# Patient Record
Sex: FEMALE | Race: BLACK OR AFRICAN AMERICAN | Employment: FULL TIME | URBAN - METROPOLITAN AREA
[De-identification: names, ages, dates, MRNs, and addresses within clinical notes are randomized per-mention and may not be internally consistent; named-entity substitution may affect disease eponyms.]

---

## 2018-10-12 ENCOUNTER — HOSPITAL ENCOUNTER (OUTPATIENT)
Dept: LAB | Age: 38
Discharge: HOME | End: 2018-10-12

## 2018-10-12 ENCOUNTER — OFFICE VISIT (OUTPATIENT)
Dept: ORTHOPEDIC SURGERY | Age: 38
End: 2018-10-12

## 2018-10-12 VITALS
TEMPERATURE: 98.4 F | WEIGHT: 231 LBS | OXYGEN SATURATION: 99 % | RESPIRATION RATE: 16 BRPM | SYSTOLIC BLOOD PRESSURE: 118 MMHG | BODY MASS INDEX: 40.93 KG/M2 | DIASTOLIC BLOOD PRESSURE: 82 MMHG | HEIGHT: 63 IN | HEART RATE: 87 BPM

## 2018-10-12 DIAGNOSIS — M51.37 DEGENERATIVE DISC DISEASE AT L5-S1 LEVEL: ICD-10-CM

## 2018-10-12 DIAGNOSIS — M54.50 LUMBAR PAIN: Primary | ICD-10-CM

## 2018-10-12 DIAGNOSIS — M05.20 RHEUMATOID ARTERITIS (HCC): ICD-10-CM

## 2018-10-12 DIAGNOSIS — M25.561 RIGHT KNEE PAIN, UNSPECIFIED CHRONICITY: ICD-10-CM

## 2018-10-12 DIAGNOSIS — M25.562 LEFT KNEE PAIN, UNSPECIFIED CHRONICITY: ICD-10-CM

## 2018-10-12 PROBLEM — E66.01 OBESITY, MORBID (HCC): Status: ACTIVE | Noted: 2018-10-12

## 2018-10-12 LAB — XX-LABCORP SPECIMEN COL,LCBCF: NORMAL

## 2018-10-12 PROCEDURE — 99001 SPECIMEN HANDLING PT-LAB: CPT | Performed by: PHYSICIAN ASSISTANT

## 2018-10-12 RX ORDER — HYDROCODONE BITARTRATE AND ACETAMINOPHEN 7.5; 325 MG/1; MG/1
1 TABLET ORAL
Qty: 28 TAB | Refills: 0 | Status: SHIPPED | OUTPATIENT
Start: 2018-10-12

## 2018-10-12 NOTE — PROGRESS NOTES
[address] [place] [address], [address] [place] [address]  [phone]           Patient: Ashley Thao                MRN: [id number]       SSN: [id number]  YOB: 1980        AGE: 44 y.o. SEX: female  Body mass index is 40.92 kg/(m^2). PCP: No primary care provider on file. 10/12/18      This office note has been dictated. REVIEW OF SYSTEMS:  Constitutional: Negative for fever, chills, weight loss and malaise/fatigue. HENT: Negative. Eyes: Negative. Respiratory: Negative. Cardiovascular: Negative. Gastrointestinal: No bowel incontinence or constipation. Genitourinary: No bladder incontinence or saddle anesthesia. Skin: Negative. Neurological: Negative. Endo/Heme/Allergies: Negative. Psychiatric/Behavioral: Negative. Musculoskeletal: As per HPI above. No past medical history on file. Current Outpatient Prescriptions:     aspirin-acetaminophen-caffeine (EXCEDRIN ES) 250-250-65 mg per tablet, Take 1 Tab by mouth., Disp: , Rfl:     No Known Allergies    Social History     Social History    Marital status: SINGLE     Spouse name: N/A    Number of children: N/A    Years of education: N/A     Occupational History    Not on file. Social History Main Topics    Smoking status: Current Every Day Smoker    Smokeless tobacco: Never Used    Alcohol use Not on file    Drug use: Not on file    Sexual activity: Not on file     Other Topics Concern    Not on file     Social History Narrative    No narrative on file       No past surgical history on file. SUBJECTIVE:  The patient is seen today for evaluation and opinion and advice regarding low back pain and bilateral lower extremity pain. The patient has a long-standing history of some back troubles. She has been seen in [place] by a couple of different physicians with no real treatment for her.   She has had knee troubles as well in the past with apparently a left knee scope in July 2017. The right knee apparently has an ACL injury with no surgery. The patient states that most of her trouble is bilateral lower extremity discomfort when she is up and ambulating, as well as being seated for extended periods. She gets pain that radiates into her knees and down her legs to the top of her foot. She has had no change in her bowel or bladder habits and no fevers or chills to report. PHYSICAL EXAMINATION:  In general, the patient is alert and oriented x 3 in no acute distress. The patient is well-developed, well-nourished, with a normal affect. The patient is afebrile. HEENT:  Head is normocephalic and atraumatic. Pupils are equally round and reactive to light and accommodation. Extraocular eye movements are intact. Neck is supple. Trachea is midline. No JVD is present. Breathing is nonlabored. Examination of the back reveals the skin is intact. There is no ecchymosis and no warmth. There is no pain to palpation to the midline lumbar spine. There is a little paraspinal muscular discomfort present. There is no pain with rotation of either hip. There is no pain to palpation of the greater trochanteric bursae. There is negative straight leg raise. There is negative calf tenderness. There is negative Christiannes. There is no evidence of DVT present. Sensory and motor is intact to the lower extremities and symmetric bilaterally to myotomes and dermatomes. Each of the knees reveals the skin is intact. There is no ecchymosis and no warmth. The previous surgical portals for the left knee scope are healed nicely. She does have some crepitus anteriorly with range of motion activities and some discomfort to palpation of the medial joint line of each of her knees.        RADIOGRAPHS:  Radiographs in the office today, including AP and lateral of the lumbar spine, show multilevel degenerative changes worse at L4-5 and degenerative disc disease with near bone-to-bone eburnation. AP, tunnel, lateral, and skyline of each of the knees show medial wear, as well as patellofemoral wear. This is moderate in each of these areas. ASSESSMENT:      1. Lumbar radiculopathy. 2. Lumbar degenerative disc disease. 3. Bilateral knee osteoarthritis. PLAN:  At this point, we discussed treatment options. I think that a lot of her discomfort is coming from her back with radicular symptomatology. We are going to get an MRI of the lumbar spine for further evaluation. We are also going to do a rheumatologic workup and obtain some labs. We will see her back afterwards for review and to discuss treatment options. We can consider a referral to The [address] [place] [address] and physical therapy, as well as treatment for the knees depending on the MRI results. She will call with any questions or concerns that shall arise.                    JR Carlos Manuel HUI, PAReeseC, ATC

## 2018-10-23 DIAGNOSIS — M05.20 RHEUMATOID ARTERITIS (HCC): ICD-10-CM

## 2018-10-26 ENCOUNTER — TELEPHONE (OUTPATIENT)
Dept: ORTHOPEDIC SURGERY | Age: 38
End: 2018-10-26

## 2018-10-26 DIAGNOSIS — M51.37 DEGENERATIVE DISC DISEASE AT L5-S1 LEVEL: Primary | ICD-10-CM

## 2018-10-26 NOTE — TELEPHONE ENCOUNTER
Our request for a MRI of the Lumbar Spine without contrast was denied by Amelia Hayes. If Mr. Fay Warner would like to complete a peer to peer review, please call [phone], Patient ID [id number]. After checking the chart the denial may have resulting from lack of conservative therapy. If the provider would please verify, I would greatly appreciate the effort. Thank you.

## 2018-10-29 ENCOUNTER — TELEPHONE (OUTPATIENT)
Dept: ORTHOPEDIC SURGERY | Age: 38
End: 2018-10-29

## 2018-10-29 NOTE — TELEPHONE ENCOUNTER
[employer] contacted MsKristin Tawanna Martin for scheduling. She states \"1 C 10/29 PT IS GOING FOR A F/U ON 11/1 W/ JSTEPHY AND SHE IS GETS MRI RESULTS BACK  SHE WCB TO [facility] IF RESULTS COME BACK OK 10/29\". Thank you.

## 2018-11-01 ENCOUNTER — OFFICE VISIT (OUTPATIENT)
Dept: ORTHOPEDIC SURGERY | Age: 38
End: 2018-11-01

## 2018-11-01 VITALS
OXYGEN SATURATION: 97 % | DIASTOLIC BLOOD PRESSURE: 79 MMHG | SYSTOLIC BLOOD PRESSURE: 127 MMHG | WEIGHT: 229.8 LBS | RESPIRATION RATE: 16 BRPM | TEMPERATURE: 98.6 F | HEART RATE: 93 BPM | BODY MASS INDEX: 40.72 KG/M2 | HEIGHT: 63 IN

## 2018-11-01 DIAGNOSIS — M54.5 MIDLINE LOW BACK PAIN, UNSPECIFIED CHRONICITY, WITH SCIATICA PRESENCE UNSPECIFIED: Primary | ICD-10-CM

## 2018-11-01 DIAGNOSIS — M79.604 BILATERAL LEG PAIN: ICD-10-CM

## 2018-11-01 DIAGNOSIS — M79.605 BILATERAL LEG PAIN: ICD-10-CM

## 2018-11-01 RX ORDER — CYCLOBENZAPRINE HCL 10 MG
10 TABLET ORAL
Qty: 45 TAB | Refills: 1 | Status: SHIPPED | OUTPATIENT
Start: 2018-11-01

## 2018-11-01 NOTE — PROGRESS NOTES
[address] [place] [address], [address] [place] [address]  [phone]           Patient: Anthony Calloway                MRN: [id number]       SSN: [id number]  YOB: 1980        AGE: 44 y.o. SEX: female  Body mass index is 40.71 kg/m². PCP: Abdoul Saleem MD  11/01/18      This office note has been dictated. REVIEW OF SYSTEMS:  Constitutional: Negative for fever, chills, weight loss and malaise/fatigue. HENT: Negative. Eyes: Negative. Respiratory: Negative. Cardiovascular: Negative. Gastrointestinal: No bowel incontinence or constipation. Genitourinary: No bladder incontinence or saddle anesthesia. Skin: Negative. Neurological: Negative. Endo/Heme/Allergies: Negative. Psychiatric/Behavioral: Negative. Musculoskeletal: As per HPI above. No past medical history on file. Current Outpatient Medications:     aspirin-acetaminophen-caffeine (EXCEDRIN ES) 250-250-65 mg per tablet, Take 1 Tab by mouth., Disp: , Rfl:     HYDROcodone-acetaminophen (NORCO) 7.5-325 mg per tablet, Take 1 Tab by mouth every six (6) hours as needed for Pain.  Max Daily Amount: 4 Tabs., Disp: 28 Tab, Rfl: 0    No Known Allergies    Social History     Socioeconomic History    Marital status: SINGLE     Spouse name: Not on file    Number of children: Not on file    Years of education: Not on file    Highest education level: Not on file   Social Needs    Financial resource strain: Not on file    Food insecurity - worry: Not on file    Food insecurity - inability: Not on file    Transportation needs - medical: Not on file   [email or web address] needs - non-medical: Not on file   Occupational History    Not on file   Tobacco Use    Smoking status: Current Every Day Smoker    Smokeless tobacco: Never Used   Substance and Sexual Activity    Alcohol use: Not on file    Drug use: Not on file    Sexual activity: Not on file   Other Topics Concern  Not on file   Social History Narrative    Not on file       No past surgical history on file. SUBJECTIVE:  The patient is seen today for reevaluation of low back pain with pain down the bilateral lower extremities. I ordered an MRI. She has not been approved for it yet. She does continue to have low back pain with pain radiating down the lower extremities. It is worse at night lying in bed. She has pain in ambulation. She reports pain in her low back and to her pelvis, to her thighs, and down her lower extremities. She has had no change in her bowel or bladder habits and no loss of control. She denies any saddle paresthesias. PHYSICAL EXAMINATION:  In general, the patient is alert and oriented x 3 in no acute distress. The patient is well-developed, well-nourished, with a normal affect. The patient is afebrile. HEENT:  Head is normocephalic and atraumatic. Pupils are equally round and reactive to light and accommodation. Extraocular eye movements are intact. Neck is supple. Trachea is midline. No JVD is present. Breathing is nonlabored. Examination of the lower extremities reveals pain-free range of motion of the hips. There is no pain to palpation of the greater trochanteric bursae. There is negative straight leg raise. There is negative calf tenderness. There is negative Christiannes. There is no evidence of DVT present. Sensory and motor are intact and symmetrical to the bilateral lower extremities. LABORATORY:  Labs and rheumatologic workup shows a sed rate of 54, C-reactive protein quantitative 22.1. RA latex is negative at 10. There is a normal uric acid of 4.0. Lymes western blot IgG is positive with IgM negative. The result is considered negative. CONCEPCION comprehensive panel is 2, which is normal.      ASSESSMENT:      1. Elevated rheumatologic markers. 2. Low back pain with radicular symptomatology.      PLAN:  At this point, we will get her set up for physical therapy for a low back program.  We will see her back in the office in about six weeks time for evaluation and to  the efficacy of physical therapy. We may consider an MRI at that point depending on her symptomatology. We will try some Flexeril at night, as she is having some spasms into her legs. We will also refer her to Rheumatology for further evaluation and treatment regarding her elevated CRP and sed rate.                      JR Carlos Manuel HUI, PAReeseC, ATC

## 2018-11-15 ENCOUNTER — TELEPHONE (OUTPATIENT)
Dept: ORTHOPEDIC SURGERY | Age: 38
End: 2018-11-15

## 2018-11-15 DIAGNOSIS — M79.604 LUMBAR PAIN WITH RADIATION DOWN BOTH LEGS: Primary | ICD-10-CM

## 2018-11-15 DIAGNOSIS — M54.50 LUMBAR PAIN WITH RADIATION DOWN BOTH LEGS: Primary | ICD-10-CM

## 2018-11-15 DIAGNOSIS — M79.605 LUMBAR PAIN WITH RADIATION DOWN BOTH LEGS: Primary | ICD-10-CM

## 2018-11-15 NOTE — TELEPHONE ENCOUNTER
Patient called to inform Lindsay Escobar she did have a previous MRI of her lumbar spine and she has the disc. Patient aware Georgina Mims is out of the office this week. Does JR want the patient to drop of the disc?     Also patient would like to be scheduled with a physical therapy center in   [address] Patient P.T[facility]  [phone]  Please call patient back at [phone]

## 2018-11-16 NOTE — TELEPHONE ENCOUNTER
PT Referral faxed to [address] [place] [address] in [place], [place] @ fax # [phone] as requested.

## 2018-11-19 NOTE — TELEPHONE ENCOUNTER
Spoke with patient, please note she is very hard to understand. She states her \"knee doctor\" did a previous LSpine MRI but they do not treat backs there so there is no report of said MRI. I explained to the patient that generally all MRI's are read by Radiologists therefore a report is generated from their dictation. Advised patient she could bring the disc in for [facility] to look at but he is going to want a report to review. Patient is extremely confused as to why she was scheduled to see [facility] in the first place since he is not a back specialist. I advised the patient that [facility] wanted to obtain an MRI and then possibly refer her to our spine center depending on the results of the MRI. Patient states she is going to bring in the disc for [facility] to review. Also going to put in a referral to our spine center.

## 2018-12-12 ENCOUNTER — TELEPHONE (OUTPATIENT)
Dept: ORTHOPEDIC SURGERY | Age: 38
End: 2018-12-12

## 2018-12-12 DIAGNOSIS — R76.8 POSITIVE LYME DISEASE SEROLOGY: Primary | ICD-10-CM

## 2018-12-12 NOTE — TELEPHONE ENCOUNTER
Referral entered for Infectious Disease per MAKEDA Horowitz.  will contact patient to schedule an appointment.

## 2018-12-12 NOTE — TELEPHONE ENCOUNTER
Lavelle Howell from [address] [place] [address] called regarding a positive Lyme test for patient. Lab test was originally done 10/12/18, Lavelle Howell has just recently gotten the results.   Please contact her to further discuss at [phone]

## 2018-12-12 NOTE — TELEPHONE ENCOUNTER
Discussed with Natalie Stoner from [facility] department    Please refer patient to infectious disease.   Pos Lymes titer

## 2019-01-15 ENCOUNTER — OFFICE VISIT (OUTPATIENT)
Dept: ORTHOPEDIC SURGERY | Age: 39
End: 2019-01-15

## 2019-01-15 VITALS
HEIGHT: 63 IN | HEART RATE: 91 BPM | DIASTOLIC BLOOD PRESSURE: 80 MMHG | BODY MASS INDEX: 40.57 KG/M2 | WEIGHT: 229 LBS | SYSTOLIC BLOOD PRESSURE: 127 MMHG

## 2019-01-15 DIAGNOSIS — F17.200 TOBACCO USE DISORDER: ICD-10-CM

## 2019-01-15 DIAGNOSIS — M51.26 HNP (HERNIATED NUCLEUS PULPOSUS), LUMBAR: Primary | ICD-10-CM

## 2019-01-15 DIAGNOSIS — M47.816 LUMBAR FACET ARTHROPATHY: ICD-10-CM

## 2019-01-15 DIAGNOSIS — M54.16 LUMBAR RADICULOPATHY: ICD-10-CM

## 2019-01-15 DIAGNOSIS — M62.838 MUSCLE SPASM: ICD-10-CM

## 2019-01-15 RX ORDER — GABAPENTIN 300 MG/1
CAPSULE ORAL
Qty: 90 CAP | Refills: 1 | Status: SHIPPED | OUTPATIENT
Start: 2019-01-15

## 2019-01-15 RX ORDER — BUSPIRONE HYDROCHLORIDE 10 MG/1
10 TABLET ORAL
COMMUNITY
Start: 2019-01-09

## 2019-01-15 RX ORDER — SERTRALINE HYDROCHLORIDE 100 MG/1
TABLET, FILM COATED ORAL
COMMUNITY
Start: 2019-01-09

## 2019-01-15 RX ORDER — DICLOFENAC SODIUM 75 MG/1
75 TABLET, DELAYED RELEASE ORAL 2 TIMES DAILY WITH MEALS
Qty: 60 TAB | Refills: 1 | Status: SHIPPED | OUTPATIENT
Start: 2019-01-15

## 2019-01-15 NOTE — PROGRESS NOTES
[facility]  [address] [place], [address]  Phone: [phone]  Fax: [phone]    NEW PATIENT  Pt's YOB: 1980    ASSESSMENT   Diagnoses and all orders for this visit:    1. HNP (herniated nucleus pulposus), lumbar  -     gabapentin (NEURONTIN) 300 mg capsule; Take 1 in the morning and 2 in the evening as directed  -     diclofenac EC (VOLTAREN) 75 mg EC tablet; Take 1 Tab by mouth two (2) times daily (with meals). -     REFERRAL TO PHYSICAL THERAPY    2. Lumbar radiculopathy  -     gabapentin (NEURONTIN) 300 mg capsule; Take 1 in the morning and 2 in the evening as directed  -     REFERRAL TO PHYSICAL THERAPY    3. Lumbar facet arthropathy  -     diclofenac EC (VOLTAREN) 75 mg EC tablet; Take 1 Tab by mouth two (2) times daily (with meals). -     REFERRAL TO PHYSICAL THERAPY    4. Muscle spasm    5. Tobacco use disorder         IMPRESSION AND PLAN:  Tianna Scott is a 43 y.o. female with history of lumbar pain x ~1 year. She complains of pain radiating down both legs to the feet. Pt notes that her lower back pain started to worsen about 1 year ago. Of note, she had previous bilateral arthroscopic knee surgeries and last has right knee surgery on 12/12/2018. She notes stomach ulcer when taking ibuprofen and is enrolled in knee physical therapy. 1) Pt was given information on cervical and lumbar arthritis exercises. 2) She was referred to [facility] physical therapy in [facility] for core strengthening and pain centralization. 3) Pt was prescribed Neurontin 300 mg 1 tab QAM and 2 tabs QHS, tapering up as directed. 4) She was prescribed Voltaren 75 mg 1 tab BID prn with food. Pt was instructed not to take ibuprofen with the medication. 5) Ms. Marie Ramirez has a reminder for a \"due or due soon\" health maintenance. I have asked that she contact her primary care provider, Other, MD Abdoul, for follow-up on this health maintenance.   6)  demonstrated consistency with prescribing. 7) Pt will follow-up in 6 weeks or sooner if needed. HISTORY OF PRESENT ILLNESS:  Fan Woods is a 43 y.o. female with history of lumbar pain x ~1 year. Pt presents to the office today as a new patient referred by La Nena Sauer. She complains of pain radiating down both legs to the feet. Pt reports increased pain when she sits or when her leg press up against anything. She admits to pain in the legs when sleeping and during the day. 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 Her job required frequent bending and lifting and she reports daily lower back pain while working. She notes that her lower back pain started to worsen about 1 year ago. Of note, she had previous bilateral arthroscopic knee surgeries and last has right knee surgery on 12/12/2018. Pt notes that she was told by different providers that she an MCL and ACL tear (she is unsure which one she actually had). She notes that she still has decreased range of motion in the right knee since her surgery in 12/2018. She denies any previous lumbar surgery. She used to take ibuprofen but discontinued this when it started to cause stomach ulcers. Pt has been attending [facility] knee physical therapy in [facility]. Pt at this time desires to proceed with medication evaluation and aqua physical therapy. Of note, she lives in [place], [place]. She is a smoker. Pain Scale: 8/10     PCP: Other, MD Abdoul    History reviewed. No pertinent past medical history.      Social History     Socioeconomic History    Marital status: SINGLE     Spouse name: Not on file    Number of children: Not on file    Years of education: Not on file    Highest education level: Not on file   Social Needs    Financial resource strain: Not on file    Food insecurity - worry: Not on file    Food insecurity - inability: Not on file   [employer] needs - medical: Not on file    Transportation needs - non-medical: Not on file   Occupational History    Not on file   Tobacco Use    Smoking status: Current Every Day Smoker    Smokeless tobacco: Never Used   Substance and Sexual Activity    Alcohol use: Yes    Drug use: Not on file    Sexual activity: Not on file   Other Topics Concern     Service Not Asked    Blood Transfusions Not Asked    Caffeine Concern Not Asked    Occupational Exposure Not Asked    Hobby Hazards Not Asked    Sleep Concern Not Asked    Stress Concern Not Asked    Weight Concern Not Asked    Special Diet Not Asked    Back Care Not Asked    Exercise Not Asked    Bike Helmet Not Asked   [address] Not Asked    Self-Exams Not Asked   Social History Narrative    Not on file       Current Outpatient Medications   Medication Sig Dispense Refill    busPIRone (BUSPAR) 10 mg tablet Take 10 mg by mouth.  sertraline (ZOLOFT) 100 mg tablet sertraline 100 mg tablet   TAKE 1 Tablet  BY MOUTH TWICE DAILY      gabapentin (NEURONTIN) 300 mg capsule Take 1 in the morning and 2 in the evening as directed 90 Cap 1    diclofenac EC (VOLTAREN) 75 mg EC tablet Take 1 Tab by mouth two (2) times daily (with meals). 60 Tab 1    cyclobenzaprine (FLEXERIL) 10 mg tablet Take 1 Tab by mouth three (3) times daily as needed for Muscle Spasm(s). 45 Tab 1    aspirin-acetaminophen-caffeine (EXCEDRIN ES) 250-250-65 mg per tablet Take 1 Tab by mouth.  HYDROcodone-acetaminophen (NORCO) 7.5-325 mg per tablet Take 1 Tab by mouth every six (6) hours as needed for Pain. Max Daily Amount: 4 Tabs. 28 Tab 0       No Known Allergies    REVIEW OF SYSTEMS    Constitutional: Negative for fever, chills, or weight change. Respiratory: Negative for cough or shortness of breath. Cardiovascular: Negative for chest pain or palpitations. Gastrointestinal: Negative for acid reflux, change in bowel habits, or constipation.   Genitourinary: Negative for dysuria and flank pain. Musculoskeletal: Positive for lumbar pain. Skin: Negative for rash. Neurological: Negative for headaches, dizziness, or numbness. Endo/Heme/Allergies: Negative for increased bruising. Psychiatric/Behavioral: Negative for difficulty with sleep. PHYSICAL EXAMINATION  Visit Vitals  /80   Pulse 91   Ht 5' 3\" (1.6 m)   Wt 229 lb (103.9 kg)   BMI 40.57 kg/m²       Constitutional: Awake, alert, and in no acute distress. HEENT: Normocephalic. Atraumatic. Oropharynx is moist and clear. PERRL. EOMI. Sclerae are nonicteric  Heart: Regular rate and rhythm  Lungs: Clear to auscultation bilaterally  Abdomen: Soft and nontender. Bowel sounds are present  Neurological: 1+ symmetrical DTRs in the upper extremities. 1+ symmetrical DTRs in the lower extremities. Sensation to light touch is intact. Negative Patsy's sign bilaterally. Skin: warm, dry, and intact. Musculoskeletal: Tight across the upper trapezius bilaterally. Tenderness to palpation in the lower lumbar region. Moderate pain with extension and axial loading. Pain with forward flexion. No pain with internal or external rotation of her hips. Negative straight leg raise bilaterally. Biceps  Triceps Deltoids Wrist Ext Wrist Flex Hand Intrin   Right +4/5 +4/5 +4/5 +4/5 +4/5 +4/5   Left +4/5 +4/5 +4/5 +4/5 +4/5 +4/5      Hip Flex  Quads Hamstrings Ankle DF EHL Ankle PF   Right +4/5 +4/5 +4/5 +4/5 +4/5 +4/5   Left +4/5 +4/5 +4/5 +4/5 +4/5 +4/5     IMAGING:    Lumbar spine MRI from 04/30/2018 was personally reviewed with the patient and demonstrated:  FINDINGS:  1. There is no defined scoliosis. The paraspinal soft tissues and SI joint areas are normal.  2. The conus medullaris is normal.  3. There is no evidence of spondylolisthesis of evidence of a compression fracture. Focal L4-5 degenerative disc volume loss is noted.   4. The L1-2 and L2-3 disc areas are normal.  5. The L3-4 disc areas is normal.  6. There L4-5 disc demonstrates a diffuse degenerative disc volume loss. There is a small broad-based subligamentous disc herniation. This is producing a shallow effacement of the exiting nerve root areas in the axial plane. There is a mild degree of narrowing of the lateral recess angle volumes. The AP neural canal volume is normal.  7. The L5-S1 disc area is normal. There is no defined stenosis or disc herniation. OPINION:  Abnormal L4-5 disc space presenting with diffuse degenerative disc volume loss and an associated small broad-based subligamentous disc protrusion with mild lateral recess angle stenosis. Lumbar spine x-rays from 11/9/2018 were personally reviewed with the patient and demonstrated:  Impression: No obvious fracture. Mild multilevel degenerative changes. The   soft tissues are unremarkable. Written by Reyes Marinas, as dictated by Jamie Muñiz MD.  I, Dr. Jamie Muñiz confirm that all documentation is accurate.

## 2019-01-15 NOTE — PATIENT INSTRUCTIONS
Low Back Arthritis: Exercises  Your Care Instructions  Here are some examples of typical rehabilitation exercises for your condition. Start each exercise slowly. Ease off the exercise if you start to have pain. Your doctor or physical therapist will tell you when you can start these exercises and which ones will work best for you. When you are not being active, find a comfortable position for rest. Some people are comfortable on the floor or a medium-firm bed with a small pillow under their head and another under their knees. Some people prefer to lie on their side with a pillow between their knees. Don't stay in one position for too long. Take short walks (10 to 20 minutes) every 2 to 3 hours. Avoid slopes, hills, and stairs until you feel better. Walk only distances you can manage without pain, especially leg pain. How to do the exercises  Pelvic tilt    1. Lie on your back with your knees bent. 2. \"Brace\" your stomach--tighten your muscles by pulling in and imagining your belly button moving toward your spine. 3. Press your lower back into the floor. You should feel your hips and pelvis rock back. 4. Hold for 6 seconds while breathing smoothly. 5. Relax and allow your pelvis and hips to rock forward. 6. Repeat 8 to 12 times. Back stretches    1. Get down on your hands and knees on the floor. 2. Relax your head and allow it to droop. Round your back up toward the ceiling until you feel a nice stretch in your upper, middle, and lower back. Hold this stretch for as long as it feels comfortable, or about 15 to 30 seconds. 3. Return to the starting position with a flat back while you are on your hands and knees. 4. Let your back sway by pressing your stomach toward the floor. Lift your buttocks toward the ceiling. 5. Hold this position for 15 to 30 seconds. 6. Repeat 2 to 4 times. Follow-up care is a key part of your treatment and safety.  Be sure to make and go to all appointments, and call your doctor if you are having problems. It's also a good idea to know your test results and keep a list of the medicines you take. Where can you learn more? Go to http://lynette-raul.info/. Enter S467 in the search box to learn more about \"Low Back Arthritis: Exercises. \"  Current as of: November 29, 2017  Content Version: 11.8  © 1472-8201 [employer]. Care instructions adapted under license by [employer] (which disclaims liability or warranty for this information). If you have questions about a medical condition or this instruction, always ask your healthcare professional. [place] [address] any warranty or liability for your use of this information. Neck Arthritis: Exercises  Your Care Instructions  Here are some examples of typical rehabilitation exercises for your condition. Start each exercise slowly. Ease off the exercise if you start to have pain. Your doctor or physical therapist will tell you when you can start these exercises and which ones will work best for you. How to do the exercises  Neck stretches to the side    7. This stretch works best if you keep your shoulder down as you lean away from it. To help you remember to do this, start by relaxing your shoulders and lightly holding on to your thighs or your chair. 8. Tilt your head toward your shoulder and hold for 15 to 30 seconds. Let the weight of your head stretch your muscles. 9. Repeat 2 to 4 times toward each shoulder. Chin tuck    7. Lie on the floor with a rolled-up towel under your neck. Your head should be touching the floor. 8. Slowly bring your chin toward your chest.  9. Hold for a count of 6, and then relax for up to 10 seconds. 10. Repeat 8 to 12 times. Active cervical rotation    1. Sit in a firm chair, or stand up straight. 2. Keeping your chin level, turn your head to the right, and hold for 15 to 30 seconds.   3. Turn your head to the left and hold for 15 to 30 seconds. 4. Repeat 2 to 4 times to each side. Shoulder blade squeeze    1. While standing, squeeze your shoulder blades together. 2. Do not raise your shoulders up as you are squeezing. 3. Hold for 6 seconds. 4. Repeat 8 to 12 times. Shoulder rolls    1. Sit comfortably with your feet shoulder-width apart. You can also do this exercise standing up. 2. Roll your shoulders up, then back, and then down in a smooth, circular motion. 3. Repeat 2 to 4 times. Follow-up care is a key part of your treatment and safety. Be sure to make and go to all appointments, and call your doctor if you are having problems. It's also a good idea to know your test results and keep a list of the medicines you take. Where can you learn more? Go to http://lynetteiContactraul.info/. Enter H362 in the search box to learn more about \"Neck Arthritis: Exercises. \"  Current as of: November 29, 2017  Content Version: 11.8  © 6608-4617 [employer]. Care instructions adapted under license by [employer] (which disclaims liability or warranty for this information). If you have questions about a medical condition or this instruction, always ask your healthcare professional. [address] any warranty or liability for your use of this information. Learning About Benefits From Quitting Smoking  How does quitting smoking make you healthier? If you're thinking about quitting smoking, you may have a few reasons to be smoke-free. Your health may be one of them. · When you quit smoking, you lower your risks for cancer, lung disease, heart attack, stroke, blood vessel disease, and blindness from macular degeneration. · When you're smoke-free, you get sick less often, and you heal faster. You are less likely to get colds, flu, bronchitis, and pneumonia. · As a nonsmoker, you may find that your mood is better and you are less stressed.   When and how will you feel healthier? Quitting has real health benefits that start from day 1 of being smoke-free. And the longer you stay smoke-free, the healthier you get and the better you feel. The first hours  · After just 20 minutes, your blood pressure and heart rate go down. That means there's less stress on your heart and blood vessels. · Within 12 hours, the level of carbon monoxide in your blood drops back to normal. That makes room for more oxygen. With more oxygen in your body, you may notice that you have more energy than when you smoked. After 2 weeks  · Your lungs start to work better. · Your risk of heart attack starts to drop. After 1 month  · When your lungs are clear, you cough less and breathe deeper, so it's easier to be active. · Your sense of taste and smell return. That means you can enjoy food more than you have since you started smoking. Over the years  · After 1 year, your risk of heart disease is half what it would be if you kept smoking. · After 5 years, your risk of stroke starts to shrink. Within a few years after that, it's about the same as if you'd never smoked. · After 10 years, your risk of dying from lung cancer is cut by about half. And your risk for many other types of cancer is lower too. How would quitting help others in your life? When you quit smoking, you improve the health of everyone who now breathes in your smoke. · Their heart, lung, and cancer risks drop, much like yours. · They are sick less. For babies and small children, living smoke-free means they're less likely to have ear infections, pneumonia, and bronchitis. · If you're a woman who is or will be pregnant someday, quitting smoking means a healthier . · Children who are close to you are less likely to become adult smokers. Where can you learn more? Go to http://lynette-raul.info/. Enter [id number] in the search box to learn more about \"Learning About Benefits From Quitting Smoking. \"  Current as of: November 29, 2017  Content Version: 11.8  © 1124-8992 [employer]. Care instructions adapted under license by [employer] (which disclaims liability or warranty for this information). If you have questions about a medical condition or this instruction, always ask your healthcare professional. [address] any warranty or liability for your use of this information.

## 2019-03-04 ENCOUNTER — HOSPITAL ENCOUNTER (OUTPATIENT)
Age: 39
Discharge: HOME | End: 2019-03-04
Attending: INTERNAL MEDICINE
Payer: COMMERCIAL

## 2019-03-04 DIAGNOSIS — M25.50 PAIN IN JOINT, MULTIPLE SITES: ICD-10-CM

## 2019-03-04 PROCEDURE — 74011636320 HC RX REV CODE- 636/320

## 2019-03-04 PROCEDURE — A9575 INJ GADOTERATE MEGLUMI 0.1ML: HCPCS

## 2019-03-04 PROCEDURE — 72197 MRI PELVIS W/O & W/DYE: CPT

## 2019-03-04 RX ADMIN — GADOTERATE MEGLUMINE 20 ML: 376.9 INJECTION INTRAVENOUS at 14:20

## 2019-03-07 ENCOUNTER — OFFICE VISIT (OUTPATIENT)
Dept: ORTHOPEDIC SURGERY | Age: 39
End: 2019-03-07

## 2019-03-07 VITALS
RESPIRATION RATE: 16 BRPM | TEMPERATURE: 98.1 F | DIASTOLIC BLOOD PRESSURE: 84 MMHG | BODY MASS INDEX: 41.32 KG/M2 | HEIGHT: 63 IN | HEART RATE: 88 BPM | OXYGEN SATURATION: 99 % | WEIGHT: 233.2 LBS | SYSTOLIC BLOOD PRESSURE: 115 MMHG

## 2019-03-07 DIAGNOSIS — M51.26 HNP (HERNIATED NUCLEUS PULPOSUS), LUMBAR: Primary | ICD-10-CM

## 2019-03-07 DIAGNOSIS — F17.200 TOBACCO USE DISORDER: ICD-10-CM

## 2019-03-07 DIAGNOSIS — M53.3 SACROILIAC PAIN: ICD-10-CM

## 2019-03-07 DIAGNOSIS — M54.16 LUMBAR RADICULOPATHY: ICD-10-CM

## 2019-03-07 DIAGNOSIS — M62.838 MUSCLE SPASM: ICD-10-CM

## 2019-03-07 DIAGNOSIS — M47.816 LUMBAR FACET ARTHROPATHY: ICD-10-CM

## 2019-03-07 NOTE — PATIENT INSTRUCTIONS
Low Back Arthritis: Exercises  Your Care Instructions  Here are some examples of typical rehabilitation exercises for your condition. Start each exercise slowly. Ease off the exercise if you start to have pain. Your doctor or physical therapist will tell you when you can start these exercises and which ones will work best for you. When you are not being active, find a comfortable position for rest. Some people are comfortable on the floor or a medium-firm bed with a small pillow under their head and another under their knees. Some people prefer to lie on their side with a pillow between their knees. Don't stay in one position for too long. Take short walks (10 to 20 minutes) every 2 to 3 hours. Avoid slopes, hills, and stairs until you feel better. Walk only distances you can manage without pain, especially leg pain. How to do the exercises  Pelvic tilt    1. Lie on your back with your knees bent. 2. \"Brace\" your stomach--tighten your muscles by pulling in and imagining your belly button moving toward your spine. 3. Press your lower back into the floor. You should feel your hips and pelvis rock back. 4. Hold for 6 seconds while breathing smoothly. 5. Relax and allow your pelvis and hips to rock forward. 6. Repeat 8 to 12 times. Back stretches    1. Get down on your hands and knees on the floor. 2. Relax your head and allow it to droop. Round your back up toward the ceiling until you feel a nice stretch in your upper, middle, and lower back. Hold this stretch for as long as it feels comfortable, or about 15 to 30 seconds. 3. Return to the starting position with a flat back while you are on your hands and knees. 4. Let your back sway by pressing your stomach toward the floor. Lift your buttocks toward the ceiling. 5. Hold this position for 15 to 30 seconds. 6. Repeat 2 to 4 times. Follow-up care is a key part of your treatment and safety.  Be sure to make and go to all appointments, and call your doctor if you are having problems. It's also a good idea to know your test results and keep a list of the medicines you take. Where can you learn more? Go to http://lynette-raul.info/. Enter P227 in the search box to learn more about \"Low Back Arthritis: Exercises. \"  Current as of: September 20, 2018  Content Version: 11.9  © 0571-8282 [employer]. Care instructions adapted under license by [employer] (which disclaims liability or warranty for this information). If you have questions about a medical condition or this instruction, always ask your healthcare professional. [address] any warranty or liability for your use of this information. Sacroiliac Pain: Exercises  Your Care Instructions  Here are some examples of typical rehabilitation exercises for your condition. Start each exercise slowly. Ease off the exercise if you start to have pain. Your doctor or physical therapist will tell you when you can start these exercises and which ones will work best for you. How to do the exercises  Knee-to-chest stretch    7. Do not do the knee-to-chest exercise if it causes or increases back or leg pain. 8. Lie on your back with your knees bent and your feet flat on the floor. You can put a small pillow under your head and neck if it is more comfortable. 9. Grasp your hands under one knee and bring the knee to your chest, keeping the other foot flat on the floor. 10. Keep your lower back pressed to the floor. Hold for at least 15 to 30 seconds. 11. Relax and lower the knee to the starting position. Repeat with the other leg. 12. Repeat 2 to 4 times with each leg. 13. To get more stretch, keep your other leg flat on the floor while pulling your knee to your chest.    Bridging    7. Lie on your back with both knees bent. Your knees should be bent about 90 degrees.   8. Tighten your belly muscles by pulling in your belly button toward your spine. Then push your feet into the floor, squeeze your buttocks, and lift your hips off the floor until your shoulders, hips, and knees are all in a straight line. 9. Hold for about 6 seconds as you continue to breathe normally, and then slowly lower your hips back down to the floor and rest for up to 10 seconds. 10. Repeat 8 to 12 times. Hip extension    1. Get down on your hands and knees on the floor. 2. Keeping your back and neck straight, lift one leg straight out behind you. When you lift your leg, keep your hips level. Don't let your back twist, and don't let your hip drop toward the floor. 3. Hold for 6 seconds. Repeat 8 to 12 times with each leg. 4. If you feel steady and strong when you do this exercise, you can make it more difficult. To do this, when you lift your leg, also lift the opposite arm straight out in front of you. For example, lift the left leg and the right arm at the same time. (This is sometimes called the \"bird dog exercise. \") Hold for 6 seconds, and repeat 8 to 12 times on each side. Clamshell    1. Lie on your side with a pillow under your head. Keep your feet and knees together and your knees bent. 2. Raise your top knee, but keep your feet together. Do not let your hips roll back. Your legs should open up like a clamshell. 3. Hold for 6 seconds. 4. Slowly lower your knee back down. Rest for 10 seconds. 5. Repeat 8 to 12 times. 6. Switch to your other side and repeat steps 1 through 5. Hamstring wall stretch    1. Lie on your back in a doorway, with one leg through the open door. 2. Slide your affected leg up the wall to straighten your knee. You should feel a gentle stretch down the back of your leg. 1. Do not arch your back. 2. Do not bend either knee. 3. Keep one heel touching the floor and the other heel touching the wall. Do not point your toes. 3. Hold the stretch for at least 1 minute to begin.  Then try to lengthen the time you hold the stretch to as long as 6 minutes. 4. Switch legs, and repeat steps 1 through 3.  5. Repeat 2 to 4 times. 6. If you do not have a place to do this exercise in a doorway, there is another way to do it:  7. Lie on your back, and bend one knee. 8. Loop a towel under the ball and toes of that foot, and hold the ends of the towel in your hands. 9. Straighten your knee, and slowly pull back on the towel. You should feel a gentle stretch down the back of your leg. 10. Switch legs, and repeat steps 1 through 3.  11. Repeat 2 to 4 times. Lower abdominal strengthening    1. Lie on your back with your knees bent and your feet flat on the floor. 2. Tighten your belly muscles by pulling your belly button in toward your spine. 3. Lift one foot off the floor and bring your knee toward your chest, so that your knee is straight above your hip and your leg is bent like the letter \"L. \"  4. Lift the other knee up to the same position. 5. Lower one leg at a time to the starting position. 6. Keep alternating legs until you have lifted each leg 8 to 12 times. 7. Be sure to keep your belly muscles tight and your back still as you are moving your legs. Be sure to breathe normally. Piriformis stretch    1. Lie on your back with your legs straight. 2. Lift your affected leg, and bend your knee. With your opposite hand, reach across your body, and then gently pull your knee toward your opposite shoulder. 3. Hold the stretch for 15 to 30 seconds. 4. Switch legs and repeat steps 1 through 3.  5. Repeat 2 to 4 times. Follow-up care is a key part of your treatment and safety. Be sure to make and go to all appointments, and call your doctor if you are having problems. It's also a good idea to know your test results and keep a list of the medicines you take. Where can you learn more? Go to http://lynette-raul.info/. Enter X960 in the search box to learn more about \"Sacroiliac Pain: Exercises. \"  Current as of: September 20, 2018  Content Version: 11.9  © 7273-4080 [employer]. Care instructions adapted under license by [employer] (which disclaims liability or warranty for this information). If you have questions about a medical condition or this instruction, always ask your healthcare professional. [address] any warranty or liability for your use of this information.

## 2019-03-07 NOTE — PROGRESS NOTES
[facility]  [address], [facility] [address]  Phone: [phone]  Fax: [phone]    Pt's YOB: 1980    ASSESSMENT   Diagnoses and all orders for this visit:    1. HNP (herniated nucleus pulposus), lumbar  -     REFERRAL TO PHYSICAL THERAPY    2. Sacroiliac pain  -     REFERRAL TO PHYSICAL THERAPY    3. Lumbar radiculopathy  -     REFERRAL TO PHYSICAL THERAPY    4. Lumbar facet arthropathy  -     REFERRAL TO PHYSICAL THERAPY    5. Muscle spasm  -     REFERRAL TO PHYSICAL THERAPY    6. Tobacco use disorder         IMPRESSION AND PLAN:  Barb Underwood is a 43 y.o. female with history of lumbar pain. Pt complains of daily aching, throbbing, sharp, stabbing, pain across the lower back with a pulling sensation/pain that radiates down the right leg. Pt states that since she was already attending physical therapy for her knee, she was unable to start aqua physical therapy. She notes minimal improvement with Neurontin 300 mg 1 tab QAM and 2 tabs QHS and Voltaren 75 mg.     1) Pt was given information on lumbar arthritis and sacroiliac exercises. 2) She will increase her Neurontin 300 mg to 1 tab QAM and 2 tabs QHS to better manage her pain. 3) Pt was referred to aqua physical therapy. 4) Discussed ordering a lumbar MRI if needed. 5) Pt was given an out of work note for 4 weeks. 6) Ms. Ruthie Lynn has a reminder for a \"due or due soon\" health maintenance. I have asked that she contact her primary care provider, Ewelina Rodgers MD, for follow-up on this health maintenance. 7)  demonstrated consistency with prescribing. 8) Pt will follow-up in 4-6 weeks or sooner if needed. HISTORY OF PRESENT ILLNESS:  Barb Underwood is a 43 y.o. female with history of lumbar pain and presents to the office today for follow up.  Pt complains of daily aching, throbbing, sharp, stabbing, pain across the lower back with a pulling sensation/pain that radiates down the right leg. She reports that she is unable to sit when her pain is severe. Pt notes that she has difficulty finding a comfortable position. She was referred to aqua physical therapy but notes that when she went in for evaluation she was charged a membership fee. Pt states that her PCP was trying to get her approved at a discounted rate. She has [employer]. Pt states that since she was already attending physical therapy for her knee, she was unable to start aqua physical therapy. She states that she has now completed knee physical therapy and is not currently in any kind of physical therapy. Of note, she is followed by a rheumatologist, Dr. Nick Roman, and notes that she underwent a pelvic MRI since her last office visit. She complains of diffuse increased pain prior to menstruation. Pt has been prescribed Neurontin 300 mg and takes 1 tab BID without relief. She reports minimal improvement with Voltaren 75 mg. Pt at this time desires to proceed with medication evaluation and aqua physical therapy. 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 Pt notes that her job requires to bend forward for extended periods of time when working with patients.      Pain Scale: /10    PCP: Aundrea Palacios MD     Past Medical History:   Diagnosis Date    Hypertension     Ill-defined condition 2017    Migraines        Social History     Socioeconomic History    Marital status: SINGLE     Spouse name: Not on file    Number of children: Not on file    Years of education: Not on file    Highest education level: Not on file   Social Needs    Financial resource strain: Not on file    Food insecurity - worry: Not on file    Food insecurity - inability: Not on file    Transportation needs - medical: Not on file   [employer] needs - non-medical: Not on file   Occupational History    Not on file   Tobacco Use    Smoking status: Current Every Day Smoker     Packs/day: 0.50    Smokeless tobacco: Never Used   Substance and Sexual Activity    Alcohol use: Yes    Drug use: Not on file    Sexual activity: Not on file   Other Topics Concern     Service Not Asked    Blood Transfusions Not Asked    Caffeine Concern Not Asked    Occupational Exposure Not Asked    Hobby Hazards Not Asked    Sleep Concern Not Asked    Stress Concern Not Asked    Weight Concern Not Asked    Special Diet Not Asked    Back Care Not Asked    Exercise Not Asked    Bike Helmet Not Asked   [address] Not Asked    Self-Exams Not Asked   Social History Narrative    Not on file       Current Outpatient Medications   Medication Sig Dispense Refill    gabapentin (NEURONTIN) 300 mg capsule Take 1 in the morning and 2 in the evening as directed 90 Cap 1    diclofenac EC (VOLTAREN) 75 mg EC tablet Take 1 Tab by mouth two (2) times daily (with meals). 60 Tab 1    aspirin-acetaminophen-caffeine (EXCEDRIN ES) 250-250-65 mg per tablet Take 1 Tab by mouth.  busPIRone (BUSPAR) 10 mg tablet Take 10 mg by mouth.  sertraline (ZOLOFT) 100 mg tablet sertraline 100 mg tablet   TAKE 1 Tablet  BY MOUTH TWICE DAILY      cyclobenzaprine (FLEXERIL) 10 mg tablet Take 1 Tab by mouth three (3) times daily as needed for Muscle Spasm(s). 45 Tab 1    HYDROcodone-acetaminophen (NORCO) 7.5-325 mg per tablet Take 1 Tab by mouth every six (6) hours as needed for Pain. Max Daily Amount: 4 Tabs. 28 Tab 0       No Known Allergies      REVIEW OF SYSTEMS    Constitutional: Negative for fever, chills, or weight change. Respiratory: Negative for cough or shortness of breath. Cardiovascular: Negative for chest pain or palpitations. Gastrointestinal: Negative for acid reflux, change in bowel habits, or constipation. Genitourinary: Negative for dysuria and flank pain. Musculoskeletal: Positive for lumbar pain. Skin: Negative for rash.    Neurological: Negative for headaches, dizziness, or numbness. Endo/Heme/Allergies: Negative for increased bruising. Psychiatric/Behavioral: Negative for difficulty with sleep. PHYSICAL EXAMINATION  Visit Vitals  /84 (BP 1 Location: Left arm, BP Patient Position: Sitting)   Pulse 88   Temp 98.1 °F (36.7 °C)   Resp 16   Ht 5' 3\" (1.6 m)   Wt 233 lb 3.2 oz (105.8 kg)   SpO2 99%   BMI 41.31 kg/m²       Constitutional: Awake, alert, and in no acute distress. Neurological: 1+ symmetrical DTRs in the upper extremities. 1+ symmetrical DTRs in the lower extremities. Sensation to light touch is intact. Negative Patsy's sign bilaterally. Skin: warm, dry, and intact. Musculoskeletal: Tight across the upper trapezius bilaterally. Tenderness to palpation in the lower lumbar region and over the right sacroiliac joint. Moderate pain with extension and axial loading. Pain with forward flexion. No pain with internal or external rotation of her hips. Positive straight leg raise on the right. Positive slump test on the right. Biceps  Triceps Deltoids Wrist Ext Wrist Flex Hand Intrin   Right +4/5 +4/5 +4/5 +4/5 +4/5 +4/5   Left +4/5 +4/5 +4/5 +4/5 +4/5 +4/5      Hip Flex  Quads Hamstrings Ankle DF EHL Ankle PF   Right +4/5 +4/5 +4/5 +4/5 +4/5 +4/5   Left +4/5 +4/5 +4/5 +4/5 +4/5 +4/5     IMAGING:    Pelvic MRI from 03/04/2019 was personally reviewed with the patient and demonstrated:  Results from [place] encounter on 03/04/19   MRI PELV W WO CONT    Addendum Addendum:   FINDINGS:  Images were reviewed. There are 2 areas of signal abnormality about the anterior left sacroiliac  joint measuring 9 mm in size on the sacral side and 5 mm in size on the iliac  side of the joint. The 5 mm iliac sided lesion demonstrates central T2 hyperintensity and has  a marginal rim of sclerosis. There is no enhancement enhancement or edema  about the lesion.   The 9 mm adjacent iliac sided lesion is about the superior aspect demonstrates intrinsic T2 hypointensity and is surrounded by a rim of fat signal,  suggestive of chronic/prior inflammatory change. No internal enhancement or adjacent  edema. No marrow edema or enhancement is evident. There is no sacroiliac joint  effusion or synovitis. Hip joints are partially included in the field-of-view. There is a  physiologic amount of fluid within the bilateral hip joints. No evidence of hip joint synovitis   IMPRESSION: 1. No sacroiliac joint effusion, synovitis, marrow edema, or enhancement  to suggest active inflammatory arthropathy. 2.  The previously described lesions about the superior aspect of the left sacroiliac joint are favored to reflect small chronic erosions. Correction: Bullet point #2 of the initial report's impression should read: 2. Diffuse T1 intermediate marrow signal as described, favored to reflect HEMATOPOEITIC marrow reconversion, often seen with chronic anemia. Recommend correlation with CBC. Kel Cutler MD 3/9/2019  7:21 AM          Narrative MRI PELV W WO CONT    History/Indications: 43 years Female. Pain in joint, multiple sites. Additional History: 78-year-old female with pelvic pain since epidural block  during  in . No history of malignancy. Technique: Multi-sequence multiplanar imaging of the sacrum and sacroiliac and  sacroiliac joints were performed without and with 20 mL Dotarem IV contrast.    Comparison: None    Findings:    Bone/joint:   No evidence of acute fracture. There is diffuse marrow signal hyperintensity which remains hyperintense  relative to the musculature on T1-weighted sequences and is not hyperintense on  T2-weighted sequences. This most likely reflects hematopoietic marrow  reconversion. Recommend correlation with CBC. Irregularity of the L4 superior endplate likely reflects a Schmorl's node.     Minimal marginal osteophytosis noted at the superior aspect of the bilateral  sacroiliac joints. There is a 9 mm cyst versus erosion within the sacral side and  a 5 mm cyst versus erosion on the iliac side superior portion of the anterior  left sacroiliac joint. Fat signal surrounds the sacral sided lesion. Enhancement  is noted within the iliac side in relation. Tiny 6 This likely reflects a  chronic erosion or degenerative subchondral cysts. Moderate annular bulges/protrusions are noted at L4/L5 and L5/S1. Mild L4/L5 and  L5/S1 spinal canal narrowing is noted. There is mild to moderate bilateral L4/L5  and mild to moderate bilateral L5/S1 foraminal narrowing. Soft Tissues:   Several nabothian cysts are present. Several varices are noted within the  bilateral. Limited assessment of the pelvic structures is otherwise  unremarkable. The visualized tendons and musculature about the pelvis are  unremarkable. Impression IMPRESSION[de-identified]  1.  Two focal areas of signal abnormality as described at the sacral and iliac  sides of the left sacroiliac joint with mild enhancement within the iliac  lesion. These may reflect degenerative subchondral cyst versus localized erosive  change. Recommend continued radiographic follow-up. 2.  Diffuse T1 intermediate marrow signal as described, favored to reflect a  metaplastic marrow reconversion, often seen with chronic anemia. Recommend  correlation with CBC. 3.  Moderate L4/L5 and L5/S1 degenerative disc disease as described. Thank you for enabling us to participate in the care of this patient. Lumbar spine MRI from 04/30/2018 was personally reviewed with the patient and demonstrated:  FINDINGS:  1. There is no defined scoliosis. The paraspinal soft tissues and SI joint areas are normal.  2. The conus medullaris is normal.  3. There is no evidence of spondylolisthesis of evidence of a compression fracture. Focal L4-5 degenerative disc volume loss is noted.   4. The L1-2 and L2-3 disc areas are normal.  5. The L3-4 disc areas is normal.  6. There L4-5 disc demonstrates a diffuse degenerative disc volume loss. There is a small broad-based subligamentous disc herniation. This is producing a shallow effacement of the exiting nerve root areas in the axial plane. There is a mild degree of narrowing of the lateral recess angle volumes. The AP neural canal volume is normal.  7. The L5-S1 disc area is normal. There is no defined stenosis or disc herniation. OPINION:  Abnormal L4-5 disc space presenting with diffuse degenerative disc volume loss and an associated small broad-based subligamentous disc protrusion with mild lateral recess angle stenosis.     Lumbar spine x-rays from 11/9/2018 were personally reviewed with the patient and demonstrated:  Impression: No obvious fracture. Mild multilevel degenerative changes. The   soft tissues are unremarkable. Written by Kameron Acosta MD, [address], as dictated by Jacinto Felder MD.  I, Dr. Jacinto Felder confirm that all documentation is accurate.

## 2019-03-07 NOTE — LETTER
NOTIFICATION OF RETURN TO WORK / SCHOOL 
 
3/7/2019 11:51 AM 
 
Ms. Pricila Smith [address] [place] To Whom It May Concern: 
 
Pricila Smith was under the care of [facility]  3-7-19. Ms. Shannan Huntley is to remain out of work until her follow up appointment on 4-4-19. If you have any questions please call the office at [phone]. Sincerely, Kameron Acosta MD

## 2022-03-19 PROBLEM — E66.01 OBESITY, MORBID (HCC): Status: ACTIVE | Noted: 2018-10-12

## 2023-02-03 ENCOUNTER — OFFICE VISIT (OUTPATIENT)
Dept: ORTHOPEDIC SURGERY | Age: 43
End: 2023-02-03
Payer: COMMERCIAL

## 2023-02-03 VITALS — WEIGHT: 140 LBS | BODY MASS INDEX: 23.9 KG/M2 | HEIGHT: 64 IN | TEMPERATURE: 97.5 F

## 2023-02-03 DIAGNOSIS — M25.562 PAIN IN BOTH KNEES, UNSPECIFIED CHRONICITY: Primary | ICD-10-CM

## 2023-02-03 DIAGNOSIS — M25.562 LEFT KNEE PAIN, UNSPECIFIED CHRONICITY: ICD-10-CM

## 2023-02-03 DIAGNOSIS — M25.561 RIGHT KNEE PAIN, UNSPECIFIED CHRONICITY: ICD-10-CM

## 2023-02-03 DIAGNOSIS — M25.561 PAIN IN BOTH KNEES, UNSPECIFIED CHRONICITY: Primary | ICD-10-CM

## 2023-02-03 DIAGNOSIS — M17.0 ARTHRITIS OF BOTH KNEES: ICD-10-CM

## 2023-02-03 RX ORDER — BETAMETHASONE SODIUM PHOSPHATE AND BETAMETHASONE ACETATE 3; 3 MG/ML; MG/ML
12 INJECTION, SUSPENSION INTRA-ARTICULAR; INTRALESIONAL; INTRAMUSCULAR; SOFT TISSUE ONCE
Status: COMPLETED | OUTPATIENT
Start: 2023-02-03 | End: 2023-02-03

## 2023-02-03 RX ADMIN — BETAMETHASONE SODIUM PHOSPHATE AND BETAMETHASONE ACETATE 12 MG: 3; 3 INJECTION, SUSPENSION INTRA-ARTICULAR; INTRALESIONAL; INTRAMUSCULAR; SOFT TISSUE at 14:53

## 2023-02-03 NOTE — PROGRESS NOTES
[address] [place] [address]  [phone]           Patient: Bridgett Esteves                MRN: [id number]       SSN: [id number]  YOB: 1980        AGE: 36 y.o. SEX: female  Body mass index is 24.03 kg/m². PCP: Kaylee Oakes MD  02/03/23            REVIEW OF SYSTEMS:  Constitutional: Negative for fever, chills, weight loss and malaise/fatigue. HENT: Negative. Eyes: Negative. Respiratory: Negative. Cardiovascular: Negative. Gastrointestinal: No bowel incontinence or constipation. Genitourinary: No bladder incontinence or saddle anesthesia. Skin: Negative. Neurological: Negative. Endo/Heme/Allergies: Negative. Psychiatric/Behavioral: Negative. Musculoskeletal: As per HPI above. Past Medical History:   Diagnosis Date    Hypertension     Ill-defined condition 2017    Migraines         Current Outpatient Medications:     busPIRone (BUSPAR) 10 mg tablet, Take 10 mg by mouth., Disp: , Rfl:     sertraline (ZOLOFT) 100 mg tablet, sertraline 100 mg tablet  TAKE 1 Tablet  BY MOUTH TWICE DAILY, Disp: , Rfl:     gabapentin (NEURONTIN) 300 mg capsule, Take 1 in the morning and 2 in the evening as directed, Disp: 90 Cap, Rfl: 1    diclofenac EC (VOLTAREN) 75 mg EC tablet, Take 1 Tab by mouth two (2) times daily (with meals). , Disp: 60 Tab, Rfl: 1    cyclobenzaprine (FLEXERIL) 10 mg tablet, Take 1 Tab by mouth three (3) times daily as needed for Muscle Spasm(s). , Disp: 45 Tab, Rfl: 1    aspirin-acetaminophen-caffeine (EXCEDRIN ES) 250-250-65 mg per tablet, Take 1 Tab by mouth., Disp: , Rfl:     HYDROcodone-acetaminophen (NORCO) 7.5-325 mg per tablet, Take 1 Tab by mouth every six (6) hours as needed for Pain.  Max Daily Amount: 4 Tabs., Disp: 28 Tab, Rfl: 0    No Known Allergies    Social History     Socioeconomic History    Marital status: SINGLE     Spouse name: Not on file    Number of children: Not on file    Years of education: Not on file    Highest education level: Not on file   Occupational History    Not on file   Tobacco Use    Smoking status: Every Day     Packs/day: 0.50     Types: Cigarettes    Smokeless tobacco: Never   Substance and Sexual Activity    Alcohol use: Yes    Drug use: Not on file    Sexual activity: Not on file   Other Topics Concern     Service Not Asked    Blood Transfusions Not Asked    Caffeine Concern Not Asked    Occupational Exposure Not Asked    Hobby Hazards Not Asked    Sleep Concern Not Asked    Stress Concern Not Asked    Weight Concern Not Asked    Special Diet Not Asked    Back Care Not Asked    Exercise Not Asked    Bike Helmet Not Asked    Seat Belt Not Asked    Self-Exams Not Asked   Social History Narrative    Not on file     Social Determinants of Health     Financial Resource Strain: Not on file   Food Insecurity: Not on file   Transportation Needs: Not on file   Physical Activity: Not on file   Stress: Not on file   Social Connections: Not on file   Intimate Partner Violence: Not on file   Housing Stability: Not on file       Past Surgical History:   Procedure Laterality Date    HX  SECTION      HX KNEE ARTHROSCOPY Bilateral     HX TUBAL LIGATION         Patient seen evaluated today for bilateral knee pain. Is been a number of years since we have seen her. She did have some arthritis of her knees and also has some abnormal rheumatologic labs. She was sent to rheumatology does continue to see them. She is currently on muscle relaxer and Mobic from them. She is under treatment for her knees. Does report trouble getting from a chair. She reports trouble stairs. She has trouble kneeling. She has occasional night pain. There are no mechanical symptomatology currently. She has been doing a home exercise program to maintain her range of motion activities. She had previous arthroscopies of her knees.     Patient denies recent fevers, chills, chest pain, SOB, or injuries. No recent systemic changes noted. A 12-point review of systems is performed today. Pertinent positives are noted. All other systems reviewed and otherwise are negative. Physical exam: General: Alert and oriented x3, nad.  well-developed, well nourished. normal affect, AF. NC/AT, EOMI, neck supple, trachea midline, no JVD present. Breathing is non-labored. Examination lower extremities reveals pain-free range of motion the hips. There is no pain to palpation the trochanter bursa. Negative straight leg raise. Negative calf tenderness. Negative Homans. No signs of DVT present. Each of the knees reveal skin intact. There is no erythema, ecchymosis or warmth. There are no signs for infection or cellulitis present. She does have discomfort patellofemoral grinding crepitus anteriorly with range of motion activities noted. Radiographs obtained the office today 2/3/2023 at the [facility] location include AP, tunnel, lateral, skyline of each of the knees does confirm end-stage arthritis of bone to bone eburnation of the patellofemoral articulation and advanced to the medial compartment bilaterally. Assessment: Bilateral knee advanced patellofemoral arthritis    Plan: At this point: We discussed treatment options. Total replacements were discussed and I recommended this point. We will try some conservative treatment for her. Today in the office we will move with a cortisone injection for each of her knees. After informed consent, under aseptic conditions, with US guided assitance, each knee was prepped with betadine and a mxiture of 3ml 1% lidocaine and 6mg of celestone was injected without complications. The patient tolerated the injection well. The patient is instructed on post-injection care. She will continue with a home exercise program.  She will continue on her anti-inflammatory.   We will get a series of viscosupplementation preapproved to try to maximize her nonoperative treatment. We will see her back in the office in about 4 5 weeks for reevaluation at which point we will begin the series of injections. Chart reviewed for the following:  Dedar DODSON PA-C, have reviewed the History, Physical and updated the Allergic reactions for Marilyn Sifuentes? TIME OUT performed immediately prior to start of procedure:  Dedra DODSON PA-C, have performed the following reviews on Marilyn Sifuentes prior to the start of the procedure:  ????????  * Patient was identified by name and date of birth   * Agreement on procedure being performed was verified  * Risks and Benefits explained to the patient  * Procedure site verified and marked as necessary  * Patient was positioned for comfort  * Consent was signed and verified    Time:2:42 PM    Body part: bilateral knees, intra-articular    Medication & Dose: 3ml 1% lidocaine and 6mg celestone each knee    Date of procedure: 02/03/23    Procedure performed by: Dedra Cruz PA-C    Provider assisted by: none    Patient assisted by: self    How tolerated by patient: tolerated the procedure well with no complications    Post Procedural Pain Scale: 10    Comments:   [address] using a frequency of 10MHz with a 12L-RS transducer head was used to confirm needle placement.   Ultrasound images captured using [address] Ultrasound machine and scanned into patient's chart       David Cox PA-C, ATC

## 2023-04-28 ENCOUNTER — OFFICE VISIT (OUTPATIENT)
Age: 43
End: 2023-04-28
Payer: COMMERCIAL

## 2023-04-28 DIAGNOSIS — M54.50 LOW BACK PAIN, UNSPECIFIED BACK PAIN LATERALITY, UNSPECIFIED CHRONICITY, UNSPECIFIED WHETHER SCIATICA PRESENT: ICD-10-CM

## 2023-04-28 DIAGNOSIS — M17.12 UNILATERAL PRIMARY OSTEOARTHRITIS, LEFT KNEE: ICD-10-CM

## 2023-04-28 DIAGNOSIS — M17.11 UNILATERAL PRIMARY OSTEOARTHRITIS, RIGHT KNEE: Primary | ICD-10-CM

## 2023-04-28 PROCEDURE — 99214 OFFICE O/P EST MOD 30 MIN: CPT | Performed by: PHYSICIAN ASSISTANT

## 2023-04-28 RX ORDER — DICLOFENAC EPOLAMINE 0.01 G/1
1 SYSTEM TOPICAL 2 TIMES DAILY
Qty: 60 PATCH | Refills: 2 | Status: SHIPPED | OUTPATIENT
Start: 2023-04-28

## 2023-04-28 NOTE — PROGRESS NOTES
Patient: Adry Teixeira                MRN: [id number]       SSN: [id number]  YOB: 1980        AGE: 36 y.o. SEX: female  There is no height or weight on file to calculate BMI. PCP: Awilda Cool MD  23      This office note has been dictated. REVIEW OF SYSTEMS:  Constitutional: Negative for fever, chills, weight loss and malaise/fatigue. HENT: Negative. Eyes: Negative. Respiratory: Negative. Cardiovascular: Negative. Gastrointestinal: No bowel incontinence or constipation. Genitourinary: No bladder incontinence or saddle anesthesia. Skin: Negative. Neurological: Negative. Endo/Heme/Allergies: Negative. Psychiatric/Behavioral: Negative. Musculoskeletal: As per HPI above. Past Medical History:   Diagnosis Date    Hypertension     Ill-defined condition 2017    Migraines       No current outpatient medications on file.     Not on File    Social History     Socioeconomic History    Marital status: Single     Spouse name: Not on file    Number of children: Not on file    Years of education: Not on file    Highest education level: Not on file   Occupational History    Not on file   Tobacco Use    Smoking status: Every Day     Packs/day: 0.50     Types: Cigarettes    Smokeless tobacco: Never   Substance and Sexual Activity    Alcohol use: Yes    Drug use: Not on file    Sexual activity: Not on file   Other Topics Concern    Not on file   Social History Narrative    Not on file     Social Determinants of Health     Financial Resource Strain: Not on file   Food Insecurity: Not on file   Transportation Needs: Not on file   Physical Activity: Not on file   Stress: Not on file   Social Connections: Not on file   Intimate Partner Violence: Not on file   Housing Stability: Not on file       Past Surgical History:   Procedure Laterality Date     SECTION      KNEE ARTHROSCOPY Bilateral     TUBAL LIGATION         Patient seen evaluated today

## 2023-05-22 DIAGNOSIS — M17.11 UNILATERAL PRIMARY OSTEOARTHRITIS, RIGHT KNEE: ICD-10-CM

## 2023-05-22 DIAGNOSIS — M17.12 UNILATERAL PRIMARY OSTEOARTHRITIS, LEFT KNEE: ICD-10-CM

## 2023-05-25 ENCOUNTER — OFFICE VISIT (OUTPATIENT)
Age: 43
End: 2023-05-25

## 2023-05-25 VITALS
OXYGEN SATURATION: 95 % | HEART RATE: 92 BPM | SYSTOLIC BLOOD PRESSURE: 125 MMHG | TEMPERATURE: 97.6 F | WEIGHT: 155 LBS | DIASTOLIC BLOOD PRESSURE: 83 MMHG | RESPIRATION RATE: 18 BRPM | HEIGHT: 64 IN | BODY MASS INDEX: 26.46 KG/M2

## 2023-05-25 DIAGNOSIS — M54.6 CHRONIC BILATERAL THORACIC BACK PAIN: Primary | ICD-10-CM

## 2023-05-25 DIAGNOSIS — M51.36 DDD (DEGENERATIVE DISC DISEASE), LUMBAR: ICD-10-CM

## 2023-05-25 DIAGNOSIS — M51.26 HNP (HERNIATED NUCLEUS PULPOSUS), LUMBAR: ICD-10-CM

## 2023-05-25 DIAGNOSIS — G89.29 CHRONIC BILATERAL LOW BACK PAIN WITH LEFT-SIDED SCIATICA: ICD-10-CM

## 2023-05-25 DIAGNOSIS — G89.29 CHRONIC BILATERAL THORACIC BACK PAIN: Primary | ICD-10-CM

## 2023-05-25 DIAGNOSIS — M54.42 CHRONIC BILATERAL LOW BACK PAIN WITH LEFT-SIDED SCIATICA: ICD-10-CM

## 2023-05-25 RX ORDER — NAPROXEN 500 MG/1
500 TABLET ORAL 2 TIMES DAILY PRN
COMMUNITY
Start: 2023-03-22 | End: 2023-05-25

## 2023-05-25 RX ORDER — PREGABALIN 200 MG/1
CAPSULE ORAL
COMMUNITY
Start: 2023-05-18

## 2023-05-25 RX ORDER — TOPIRAMATE 50 MG/1
50 CAPSULE, EXTENDED RELEASE ORAL DAILY
COMMUNITY
Start: 2022-11-17 | End: 2023-05-25

## 2023-05-25 RX ORDER — FUROSEMIDE 20 MG/1
TABLET ORAL
COMMUNITY
Start: 2023-04-26

## 2023-05-25 RX ORDER — EPINEPHRINE 0.3 MG/.3ML
0.3 INJECTION SUBCUTANEOUS PRN
COMMUNITY
Start: 2022-04-07

## 2023-05-25 RX ORDER — METHYLPREDNISOLONE 4 MG/1
TABLET ORAL
Qty: 1 KIT | Refills: 0 | Status: SHIPPED | OUTPATIENT
Start: 2023-05-25 | End: 2023-05-31

## 2023-05-25 RX ORDER — CYCLOBENZAPRINE HCL 10 MG
10 TABLET ORAL 3 TIMES DAILY PRN
COMMUNITY
Start: 2023-03-22 | End: 2023-05-25

## 2023-05-25 RX ORDER — TRAZODONE HYDROCHLORIDE 100 MG/1
TABLET ORAL
COMMUNITY
End: 2023-05-25

## 2023-05-25 RX ORDER — SERTRALINE HYDROCHLORIDE 100 MG/1
TABLET, FILM COATED ORAL
COMMUNITY
Start: 2019-01-09 | End: 2023-05-25

## 2023-05-25 RX ORDER — MELOXICAM 15 MG/1
TABLET ORAL
COMMUNITY
Start: 2023-03-15

## 2023-05-25 RX ORDER — GABAPENTIN 300 MG/1
CAPSULE ORAL
COMMUNITY
Start: 2019-01-15 | End: 2023-05-25

## 2023-05-25 RX ORDER — TIZANIDINE 4 MG/1
TABLET ORAL
COMMUNITY
Start: 2023-03-21

## 2023-05-25 RX ORDER — ERGOCALCIFEROL 1.25 MG/1
CAPSULE ORAL
COMMUNITY
End: 2023-05-25

## 2023-05-25 RX ORDER — HYDROCODONE BITARTRATE AND ACETAMINOPHEN 7.5; 325 MG/1; MG/1
TABLET ORAL
COMMUNITY
Start: 2018-10-12 | End: 2023-05-25

## 2023-05-25 RX ORDER — OMEPRAZOLE 20 MG/1
CAPSULE, DELAYED RELEASE ORAL
COMMUNITY
Start: 2023-04-18

## 2023-05-25 NOTE — PROGRESS NOTES
Chief complaint   Chief Complaint   Patient presents with    Back Problem    Pain    Back Pain       History of Present Illness:  Marychuy Tucker is a  36 y.o.  female who comes in today as a new patient. She was previously seen by Dr. Cheryle Press on 2019. She states she continues to have some upper back pain in the thoracic region but predominantly low back pain that radiates into her left buttock and down to her foot for the past 5 years. She states she did physical therapy last October and continues to do a home exercise program but she does not really feel like it helped that much. She is also doing yoga to try to help with that. She is currently on Lyrica 200 mg daily and Mobic 15 mg daily. She also has a history of fibromyalgia. She states her back pain is increased when she has to hold her urine or her bowels and with walking and sitting. She denies fever bowel bladder dysfunction. 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 She smokes 1 pack Sterets per week. She denies fever bowel bladder dysfunction. She states her neurologist is the one who has her on Lyrica. She has bilateral knee osteoarthritis and states that Sander Conley sent her to a rheumatologist but she has not heard from them yet. She states she also did blood work in preparation for that rheumatology referral.  She denies fever bowel bladder dysfunction. Previous MRI was in 2018 that showed L4-5 degenerative disc disease and a small herniated disc. Past Medical History:    Peripheral edema, fibromyalgia, depression, migraines, bilateral knee osteoarthritis. Past Surgical History:    Right carpal tunnel release, , bilateral knee arthroscopies, tonsillectomy, bilateral tubal ligation      Physical Exam: The patient is a 23-year-old female well-developed well-nourished who is alert and oriented with a normal mood and affect.   She has a full

## 2023-05-25 NOTE — PROGRESS NOTES
Megan Esqueda presents today for   Chief Complaint   Patient presents with    Back Problem    Pain    Back Pain       Is someone accompanying this pt? NO    Is the patient using any DME equipment during OV? NO    Depression Screening:  No flowsheet data found. Learning Assessment:  No flowsheet data found. Abuse Screening:  No flowsheet data found. Fall Risk  No flowsheet data found. OPIOID RISK TOOL  No flowsheet data found. Coordination of Care:  1. Have you been to the ER, urgent care clinic since your last visit? NO  Hospitalized since your last visit? NO    2. Have you seen or consulted any other health care providers outside of the [address] [place] [address] since your last visit? NO Include any pap smears or colon screening.  NO

## 2023-05-31 ENCOUNTER — TELEPHONE (OUTPATIENT)
Age: 43
End: 2023-05-31

## 2023-05-31 DIAGNOSIS — R76.8 ELEVATED ANTINUCLEAR ANTIBODY (ANA) LEVEL: Primary | ICD-10-CM

## 2023-05-31 NOTE — TELEPHONE ENCOUNTER
Called to inquire about patient questions. Patient identified by name and date of birth. Patient inquired if we had labs. Labs were done but not released, initially told patient that we saw that she had her lab work done. Patient began asking questions I could not answer, and I told her that it was unfortunate that she was still in pain, but this was not unheard of. 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 \" Told me to stop being disrespectful. Asked for my first name and title and said, \"I'll remember your name\". Manager aware. Provider aware.

## 2023-05-31 NOTE — TELEPHONE ENCOUNTER
Patient is requesting a call back had a few question about her lab work and about a referral to a rheumatology.         [phone]

## 2023-06-01 NOTE — TELEPHONE ENCOUNTER
Spoke with patient and informed her that there were some markers indicating that she may have rheumatoid arhritis and that it is recommended for her to go to a rheumatologist to get further evaluated and to have an appropriate treatment plan. Pt thanked writer for call and information.